# Patient Record
Sex: MALE | Race: WHITE | Employment: OTHER | URBAN - METROPOLITAN AREA
[De-identification: names, ages, dates, MRNs, and addresses within clinical notes are randomized per-mention and may not be internally consistent; named-entity substitution may affect disease eponyms.]

---

## 2022-04-01 PROBLEM — R60.9 EDEMA: Status: ACTIVE | Noted: 2022-04-01

## 2022-04-01 PROBLEM — A04.8 HELICOBACTER PYLORI GASTROINTESTINAL TRACT INFECTION: Status: ACTIVE | Noted: 2020-12-01

## 2022-04-01 PROBLEM — D64.9 ANEMIA: Status: ACTIVE | Noted: 2022-04-01

## 2022-04-01 PROBLEM — J44.9 MILD CHRONIC OBSTRUCTIVE PULMONARY DISEASE (HCC): Status: ACTIVE | Noted: 2022-04-01

## 2022-04-01 PROBLEM — N40.0 BENIGN PROSTATIC HYPERPLASIA: Status: ACTIVE | Noted: 2022-04-01

## 2022-04-01 PROBLEM — M19.90 OSTEOARTHROSIS: Status: ACTIVE | Noted: 2022-04-01

## 2022-04-01 PROBLEM — C22.0 HEPATOCELLULAR CARCINOMA (HCC): Status: ACTIVE | Noted: 2022-04-01

## 2022-04-01 PROBLEM — R63.4 ABNORMAL WEIGHT LOSS: Status: ACTIVE | Noted: 2022-04-01

## 2022-04-01 PROBLEM — B19.20 VIRAL HEPATITIS C: Status: ACTIVE | Noted: 2022-04-01

## 2022-04-01 PROBLEM — B18.2 HEPATIC CIRRHOSIS DUE TO CHRONIC HEPATITIS C INFECTION (HCC): Status: ACTIVE | Noted: 2022-04-01

## 2022-04-01 PROBLEM — E51.9 THIAMINE DEFICIENCY: Status: ACTIVE | Noted: 2022-04-01

## 2022-04-01 PROBLEM — K74.60 HEPATIC CIRRHOSIS DUE TO CHRONIC HEPATITIS C INFECTION (HCC): Status: ACTIVE | Noted: 2022-04-01

## 2022-04-01 PROBLEM — C22.9 MALIGNANT NEOPLASM OF LIVER (HCC): Status: ACTIVE | Noted: 2022-04-01

## 2022-04-01 PROBLEM — D89.2 HYPERGAMMAGLOBULINEMIA: Status: ACTIVE | Noted: 2022-04-01

## 2022-09-23 NOTE — H&P (VIEW-ONLY)
ASSESSMENT:  1. BPH w/LUTS on CIC x5 daily    Rx: Flomax 0.8 mg and Avodart 0.5 mg   Symptom(s): urinary retention. Size: 140 cc (TRUS 04/2022)   Cysto 4/2/22: trilobar hypertrophy    UDS 05/20/22 : Obstructed     2. PSA Screening   Last PSA 07/26/21: 3.6   JULISSA 07/20/22 100 grams and benign  JULISSA 03/18/22-  Prostate is 3+. 3. Recurrent UTI   Last UCx 05/11/22, + E.faecalis treated w/ Macrobid 100 mg BID x 10 days    4. Hepatocellular Carcinoma, s/p partial DaVinci hepatectomy in 6/2021 at [facility] with Hepatology in the [facility]    5. H/o Alcoholic Cirrhosis of Liver  7. H/o Hepatitis C    Indication retention, proceed to HoLEP. Cleared by GI. Platelets >29 OK. PLAN:    1. Cath urine sent for culture today, will rx abx pre-operatively to treat what grows   2. Proceed to scheduled for HoLEP 10/17/22  3. Continue CIC x5, happy to refills CIC re-teaching performed today. 4 Continue Flomax 0.8mg daily until surgery  5. Continue Avodart 0.5mg daily, until surgery   6. Provided literature and video discussing HoLEP procedure-provided last office visit  7. RTC Post-op         DISCUSSION:  Discussed BPH at length, including problems that can when untreated can lead to  infection, bladder stones, hematuria, incontinence, bladder damage and kidney damage but not limited to these conditions. Discussed the options for management of LUTS/Benign Prostatic Hyperplasia;  including watchful waiting, medical therapy, and prostate procedures briefly - including Urolift, Rezum, and PVP. Chief Complaint   Patient presents with    Benign Prostatic Hypertrophy    (LUTS) Lower Urinary Tract Symptoms           HISTORY OF PRESENT ILLNESS: Miguel Guajardo is a 81 y.o. male who is seen today for a HoLEP consultation. Prev. followed by Dr. Anabella Chapa.  A cystoscopy performed on 4/2/2022 revealed trilobar hypertrophy, was able to get over the median lobe and see the right UO but unable to see left UO 2/2 median lobe. He continues with Flomax 0.4mg and Avodart 0.5mg. Underwent UDS and attempted to void with a prolonged continuous high pressure detrusor contraction reaching a high of 80cm H2O without flow. Continues to perform CIC x 5 a day, he experiences intermittent episodes of bleeding after use of catheter. Today patient reports doing well in the interim. Tentatively scheduled for HoLEP 10/17 pending clearance  Pt. States that he consulted with GI/Hepatology  in the interim @ the [address]   He is ready to proceed to HoLEP. Expressed understanding of everything that was discussed today. R/Bs Discussed. Expressed Abx have been cost prohibitive in the past as he gets his medication from the [facility] and states that he is on a restricted income    Baseline Symptoms:  Retention: on CIC x5 daily since 2021  Denies flank/abd pain  Denies dysuria,   Denies hematuria  Rx: Flomax 0.8 mg and Avodart 0.5 mg  Recurrent UTI:  Last UCx 22, + E.faecalis  treated w/ Macrobid 100 mg BID x 10 days    Denies f/c/n/v.  ED: Not sexually active at this time. Not bothered by potential anejaculation SEs of HoLEP    PMHx  s/p partial DaVinci hepatectomy for hepatocellular carcinoma in 2021 at [facility]      No flowsheet data found. Past Medical History:   Diagnosis Date    Alcoholic cirrhosis of liver with ascites (HCC)     Anemia     BPH (benign prostatic hyperplasia)     COPD (chronic obstructive pulmonary disease) (HCC)     GERD (gastroesophageal reflux disease)     History of hepatitis C     History of urinary retention     Osteoarthritis        History reviewed. No pertinent surgical history. Social History     Tobacco Use    Smoking status: Former     Types: Cigarettes     Quit date: 3/18/2017     Years since quittin.5    Smokeless tobacco: Former       Allergies   Allergen Reactions    Penicillins Itching, Hives and Shortness of Breath    Vit E-Nonoxynol 9-Aloe Vera Rash       History reviewed.  No pertinent family history. Review of Systems  Constitutional: Fever: No  Skin: Rash: No  HEENT: Hearing difficulty: No  Eyes: Blurred vision: No  Cardiovascular: Chest pain: No  Respiratory: Shortness of breath: No  Gastrointestinal: Nausea/vomiting: No  Musculoskeletal: Back pain: No  Neurological: Weakness:   Psychological: Memory loss: No  Comments/additional findings:     Constitutional: WDWN, Pleasant and appropriate affect, No acute distress. CV:  No peripheral swelling noted  Respiratory: No respiratory distress or difficulties   Male:    JULISSA 07/20/2022: Perineum normal to visual inspection, no erythema or irritation, Sphincter with good tone, Rectum with multiple external and internal hemorrhoids, no fissures or masses. Prostate is 100 grams and benign  Skin: No jaundice. Neuro/Psych:  Alert and oriented x 3. Affect appropriate.          REVIEW OF LABS AND IMAGING:      Results for orders placed or performed during the hospital encounter of 09/28/22   EKG, 12 LEAD, INITIAL   Result Value Ref Range    Ventricular Rate 61 BPM    Atrial Rate 61 BPM    P-R Interval 152 ms    QRS Duration 80 ms    Q-T Interval 416 ms    QTC Calculation (Bezet) 418 ms    Calculated P Axis 38 degrees    Calculated R Axis 74 degrees    Calculated T Axis 69 degrees    Diagnosis       Normal sinus rhythm  Normal ECG  No previous ECGs available     Results for orders placed or performed in visit on 09/28/22   AMB POC URINALYSIS DIP STICK AUTO W/O MICRO   Result Value Ref Range    Color (UA POC) Yellow     Clarity (UA POC) Clear     Glucose (UA POC) Negative Negative    Bilirubin (UA POC) Negative Negative    Ketones (UA POC) Negative Negative    Specific gravity (UA POC) 1.015 1.001 - 1.035    Blood (UA POC) Negative Negative    pH (UA POC) 6.5 4.6 - 8.0    Protein (UA POC) Negative Negative    Urobilinogen (UA POC) 0.2 mg/dL 0.2 - 1    Nitrites (UA POC) Negative Negative    Leukocyte esterase (UA POC) Negative Negative Roni Rodgers MD  [facility] [address], [facility] [address]  [phone] (office)      Encounter Diagnoses     ICD-10-CM ICD-9-CM   1. BPH with obstruction/lower urinary tract symptoms  N40.1 600.01    N13.8 599.69   2.  Urinary retention  R33.9 788.20           Medical documentation provided with the assistance of Leatha Hernandez, medical scribe for Roni Rodgers MD.

## 2022-09-27 RX ORDER — AMPICILLIN 2 G/1
2 INJECTION, POWDER, FOR SOLUTION INTRAVENOUS EVERY 6 HOURS
Status: CANCELLED | OUTPATIENT
Start: 2022-10-17

## 2022-09-28 ENCOUNTER — TRANSCRIBE ORDER (OUTPATIENT)
Dept: REGISTRATION | Age: 72
End: 2022-09-28

## 2022-09-28 ENCOUNTER — HOSPITAL ENCOUNTER (OUTPATIENT)
Dept: LAB | Age: 72
Discharge: HOME | End: 2022-09-28
Payer: OTHER GOVERNMENT

## 2022-09-28 DIAGNOSIS — R33.8 ENLARGED PROSTATE WITH URINARY RETENTION: ICD-10-CM

## 2022-09-28 DIAGNOSIS — Z01.812 BLOOD TESTS PRIOR TO TREATMENT OR PROCEDURE: ICD-10-CM

## 2022-09-28 DIAGNOSIS — N40.1 ENLARGED PROSTATE WITH URINARY OBSTRUCTION: ICD-10-CM

## 2022-09-28 DIAGNOSIS — N40.1 ENLARGED PROSTATE WITH URINARY OBSTRUCTION: Primary | ICD-10-CM

## 2022-09-28 DIAGNOSIS — N40.1 ENLARGED PROSTATE WITH URINARY RETENTION: ICD-10-CM

## 2022-09-28 DIAGNOSIS — N13.8 ENLARGED PROSTATE WITH URINARY OBSTRUCTION: Primary | ICD-10-CM

## 2022-09-28 DIAGNOSIS — N13.8 ENLARGED PROSTATE WITH URINARY OBSTRUCTION: ICD-10-CM

## 2022-09-28 LAB
ALBUMIN SERPL-MCNC: 3.3 G/DL (ref 3.4–5)
ALBUMIN/GLOB SERPL: 0.8 {RATIO} (ref 0.8–1.7)
ALP SERPL-CCNC: 119 U/L (ref 45–117)
ALT SERPL-CCNC: 25 U/L (ref 16–61)
ANION GAP SERPL CALC-SCNC: 5 MMOL/L (ref 3–18)
AST SERPL-CCNC: 29 U/L (ref 10–38)
ATRIAL RATE: 61 BPM
BASOPHILS # BLD: 0 K/UL (ref 0–0.1)
BASOPHILS NFR BLD: 1 % (ref 0–2)
BILIRUB SERPL-MCNC: 1 MG/DL (ref 0.2–1)
BUN SERPL-MCNC: 9 MG/DL (ref 7–18)
BUN/CREAT SERPL: 12 (ref 12–20)
CALCIUM SERPL-MCNC: 8.6 MG/DL (ref 8.5–10.1)
CALCULATED P AXIS, ECG09: 38 DEGREES
CALCULATED R AXIS, ECG10: 74 DEGREES
CALCULATED T AXIS, ECG11: 69 DEGREES
CHLORIDE SERPL-SCNC: 109 MMOL/L (ref 100–111)
CO2 SERPL-SCNC: 27 MMOL/L (ref 21–32)
CREAT SERPL-MCNC: 0.74 MG/DL (ref 0.6–1.3)
DIAGNOSIS, 93000: NORMAL
DIFFERENTIAL METHOD BLD: ABNORMAL
EOSINOPHIL # BLD: 0.3 K/UL (ref 0–0.4)
EOSINOPHIL NFR BLD: 6 % (ref 0–5)
ERYTHROCYTE [DISTWIDTH] IN BLOOD BY AUTOMATED COUNT: 14.6 % (ref 11.6–14.5)
GLOBULIN SER CALC-MCNC: 4.1 G/DL (ref 2–4)
GLUCOSE SERPL-MCNC: 99 MG/DL (ref 74–99)
HCT VFR BLD AUTO: 42.3 % (ref 36–48)
HGB BLD-MCNC: 13.9 G/DL (ref 13–16)
IMM GRANULOCYTES # BLD AUTO: 0 K/UL (ref 0–0.04)
IMM GRANULOCYTES NFR BLD AUTO: 0 % (ref 0–0.5)
INR PPP: 1.2 (ref 0.8–1.2)
LYMPHOCYTES # BLD: 0.9 K/UL (ref 0.9–3.6)
LYMPHOCYTES NFR BLD: 20 % (ref 21–52)
MCH RBC QN AUTO: 30.6 PG (ref 24–34)
MCHC RBC AUTO-ENTMCNC: 32.9 G/DL (ref 31–37)
MCV RBC AUTO: 93.2 FL (ref 78–100)
MONOCYTES # BLD: 0.4 K/UL (ref 0.05–1.2)
MONOCYTES NFR BLD: 10 % (ref 3–10)
NEUTS SEG # BLD: 2.8 K/UL (ref 1.8–8)
NEUTS SEG NFR BLD: 63 % (ref 40–73)
NRBC # BLD: 0 K/UL (ref 0–0.01)
NRBC BLD-RTO: 0 PER 100 WBC
P-R INTERVAL, ECG05: 152 MS
PLATELET # BLD AUTO: 86 K/UL (ref 135–420)
PLATELET COMMENTS,PCOM: ABNORMAL
PMV BLD AUTO: 10.8 FL (ref 9.2–11.8)
POTASSIUM SERPL-SCNC: 4.7 MMOL/L (ref 3.5–5.5)
PROT SERPL-MCNC: 7.4 G/DL (ref 6.4–8.2)
PROTHROMBIN TIME: 15.3 SEC (ref 11.5–15.2)
Q-T INTERVAL, ECG07: 416 MS
QRS DURATION, ECG06: 80 MS
QTC CALCULATION (BEZET), ECG08: 418 MS
RBC # BLD AUTO: 4.54 M/UL (ref 4.35–5.65)
RBC MORPH BLD: ABNORMAL
SODIUM SERPL-SCNC: 141 MMOL/L (ref 136–145)
VENTRICULAR RATE, ECG03: 61 BPM
WBC # BLD AUTO: 4.4 K/UL (ref 4.6–13.2)

## 2022-09-28 PROCEDURE — 85610 PROTHROMBIN TIME: CPT

## 2022-09-28 PROCEDURE — 80053 COMPREHEN METABOLIC PANEL: CPT

## 2022-09-28 PROCEDURE — 93005 ELECTROCARDIOGRAM TRACING: CPT

## 2022-09-28 PROCEDURE — 36415 COLL VENOUS BLD VENIPUNCTURE: CPT

## 2022-09-28 PROCEDURE — 85025 COMPLETE CBC W/AUTO DIFF WBC: CPT

## 2022-10-11 NOTE — PERIOP NOTES
PRE-SURGICAL INSTRUCTIONS        Patient's Name:  Nkechi Taveras      OZWKZALEN Date:  10/11/2022            Surgery Date:  10/17/2022                Do NOT eat or drink anything, including candy, gum, or ice chips after midnight on 10/17/2022, unless you have specific instructions from your surgeon or anesthesia provider to do so. You may brush your teeth before coming to the hospital.  No smoking 24 hours prior to the day of surgery. No alcohol 24 hours prior to the day of surgery. No recreational drugs for one week prior to the day of surgery. Leave all valuables, including money/purse, at home. Remove all jewelry, nail polish, acrylic nails, and makeup (including mascara); no lotions powders, deodorant, or perfume/cologne/after shave on the skin. Follow instruction for Hibiclens washes and CHG wipes from surgeon's office. Glasses/contact lenses and dentures may be worn to the hospital.  They will be removed prior to surgery. Call your doctor if symptoms of a cold or illness develop within 24-48 hours prior to your surgery. 11.  If you are having an outpatient procedure, please make arrangements for a responsible ADULT TO [address] [place] [address] and stay with you for 24 hours after your surgery. 12. ONE VISITOR in the hospital at this time for outpatient procedures. Exceptions may be made for surgical admissions, per nursing unit guidelines      Special Instructions:      Bring list of CURRENT medications. Bring inhaler. Bring any pertinent legal medical records. Take these medications the morning of surgery with a sip of water:  advair inhalor          On the day of surgery, come in the main entrance of [facility]. Let the  at the desk know you are there for surgery. A staff member will come escort you to the surgical area on the second floor.     If you have any questions or concerns, please do not hesitate to call:     (Prior to the day of surgery) REGINALDO department:  [phone]   (Day of surgery) Pre-Op department:  [phone]    These surgical instructions were reviewed with Carl Vieira during the PAT phone call.

## 2022-10-14 ENCOUNTER — ANESTHESIA EVENT (OUTPATIENT)
Dept: SURGERY | Age: 72
End: 2022-10-14
Payer: OTHER GOVERNMENT

## 2022-10-17 ENCOUNTER — ANESTHESIA (OUTPATIENT)
Dept: SURGERY | Age: 72
End: 2022-10-17
Payer: OTHER GOVERNMENT

## 2022-10-17 ENCOUNTER — HOSPITAL ENCOUNTER (OUTPATIENT)
Age: 72
Discharge: HOME | End: 2022-10-17
Attending: UROLOGY | Admitting: UROLOGY
Payer: OTHER GOVERNMENT

## 2022-10-17 VITALS
HEIGHT: 72 IN | SYSTOLIC BLOOD PRESSURE: 104 MMHG | OXYGEN SATURATION: 97 % | HEART RATE: 86 BPM | TEMPERATURE: 97.4 F | DIASTOLIC BLOOD PRESSURE: 55 MMHG | BODY MASS INDEX: 23.16 KG/M2 | WEIGHT: 171 LBS | RESPIRATION RATE: 16 BRPM

## 2022-10-17 DIAGNOSIS — N40.1 ENLARGED PROSTATE WITH URINARY OBSTRUCTION: Primary | ICD-10-CM

## 2022-10-17 DIAGNOSIS — N13.8 ENLARGED PROSTATE WITH URINARY OBSTRUCTION: Primary | ICD-10-CM

## 2022-10-17 LAB
AMPHET UR QL SCN: NEGATIVE
BARBITURATES UR QL SCN: NEGATIVE
BENZODIAZ UR QL: NEGATIVE
CANNABINOIDS UR QL SCN: POSITIVE
COCAINE UR QL SCN: NEGATIVE
HDSCOM,HDSCOM: ABNORMAL
METHADONE UR QL: NEGATIVE
OPIATES UR QL: NEGATIVE
PCP UR QL: NEGATIVE

## 2022-10-17 PROCEDURE — 74011000258 HC RX REV CODE- 258: Performed by: NURSE ANESTHETIST, CERTIFIED REGISTERED

## 2022-10-17 PROCEDURE — 2709999900 HC NON-CHARGEABLE SUPPLY: Performed by: UROLOGY

## 2022-10-17 PROCEDURE — 77030018836 HC SOL IRR NACL ICUM -A: Performed by: UROLOGY

## 2022-10-17 PROCEDURE — 77030012961 HC IRR KT CYSTO/TUR ICUM -A: Performed by: UROLOGY

## 2022-10-17 PROCEDURE — 77030040922 HC BLNKT HYPOTHRM STRY -A: Performed by: UROLOGY

## 2022-10-17 PROCEDURE — 76210000022 HC REC RM PH II 1.5 TO 2 HR: Performed by: UROLOGY

## 2022-10-17 PROCEDURE — 77030037203 HC TBNG MORCLTR PIRHANA DISP RWOL -C: Performed by: UROLOGY

## 2022-10-17 PROCEDURE — 00914 ANES TRURL PX RESCJ PRST8: CPT | Performed by: NURSE ANESTHETIST, CERTIFIED REGISTERED

## 2022-10-17 PROCEDURE — 74011250636 HC RX REV CODE- 250/636: Performed by: UROLOGY

## 2022-10-17 PROCEDURE — 74011250636 HC RX REV CODE- 250/636: Performed by: NURSE ANESTHETIST, CERTIFIED REGISTERED

## 2022-10-17 PROCEDURE — 77030040361 HC SLV COMPR DVT MDII -B: Performed by: UROLOGY

## 2022-10-17 PROCEDURE — 74011250637 HC RX REV CODE- 250/637: Performed by: UROLOGY

## 2022-10-17 PROCEDURE — 76060000035 HC ANESTHESIA 2 TO 2.5 HR: Performed by: UROLOGY

## 2022-10-17 PROCEDURE — 74011000250 HC RX REV CODE- 250: Performed by: NURSE ANESTHETIST, CERTIFIED REGISTERED

## 2022-10-17 PROCEDURE — 74011000258 HC RX REV CODE- 258: Performed by: UROLOGY

## 2022-10-17 PROCEDURE — 00914 ANES TRURL PX RESCJ PRST8: CPT | Performed by: ANESTHESIOLOGY

## 2022-10-17 PROCEDURE — 76010000171 HC OR TIME 2 TO 2.5 HR INTENSV-TIER 1: Performed by: UROLOGY

## 2022-10-17 PROCEDURE — 74011250637 HC RX REV CODE- 250/637: Performed by: NURSE ANESTHETIST, CERTIFIED REGISTERED

## 2022-10-17 PROCEDURE — 88305 TISSUE EXAM BY PATHOLOGIST: CPT

## 2022-10-17 PROCEDURE — 77030020847 HC STATLOK BARD -A: Performed by: UROLOGY

## 2022-10-17 PROCEDURE — 74011000250 HC RX REV CODE- 250: Performed by: UROLOGY

## 2022-10-17 PROCEDURE — 80307 DRUG TEST PRSMV CHEM ANLYZR: CPT

## 2022-10-17 PROCEDURE — C1782 MORCELLATOR: HCPCS | Performed by: UROLOGY

## 2022-10-17 PROCEDURE — 76210000017 HC OR PH I REC 1.5 TO 2 HR: Performed by: UROLOGY

## 2022-10-17 RX ORDER — SODIUM CHLORIDE, SODIUM LACTATE, POTASSIUM CHLORIDE, CALCIUM CHLORIDE 600; 310; 30; 20 MG/100ML; MG/100ML; MG/100ML; MG/100ML
25 INJECTION, SOLUTION INTRAVENOUS CONTINUOUS
Status: DISCONTINUED | OUTPATIENT
Start: 2022-10-17 | End: 2022-10-17 | Stop reason: HOSPADM

## 2022-10-17 RX ORDER — SODIUM CHLORIDE 0.9 % (FLUSH) 0.9 %
5-40 SYRINGE (ML) INJECTION EVERY 8 HOURS
Status: DISCONTINUED | OUTPATIENT
Start: 2022-10-17 | End: 2022-10-17 | Stop reason: HOSPADM

## 2022-10-17 RX ORDER — FENTANYL CITRATE 50 UG/ML
INJECTION, SOLUTION INTRAMUSCULAR; INTRAVENOUS AS NEEDED
Status: DISCONTINUED | OUTPATIENT
Start: 2022-10-17 | End: 2022-10-17 | Stop reason: HOSPADM

## 2022-10-17 RX ORDER — LIDOCAINE HYDROCHLORIDE 10 MG/ML
0.1 INJECTION, SOLUTION EPIDURAL; INFILTRATION; INTRACAUDAL; PERINEURAL AS NEEDED
Status: DISCONTINUED | OUTPATIENT
Start: 2022-10-17 | End: 2022-10-17 | Stop reason: HOSPADM

## 2022-10-17 RX ORDER — MIDAZOLAM HYDROCHLORIDE 1 MG/ML
INJECTION, SOLUTION INTRAMUSCULAR; INTRAVENOUS AS NEEDED
Status: DISCONTINUED | OUTPATIENT
Start: 2022-10-17 | End: 2022-10-17 | Stop reason: HOSPADM

## 2022-10-17 RX ORDER — SUCCINYLCHOLINE CHLORIDE 100 MG/5ML
SYRINGE (ML) INTRAVENOUS AS NEEDED
Status: DISCONTINUED | OUTPATIENT
Start: 2022-10-17 | End: 2022-10-17 | Stop reason: HOSPADM

## 2022-10-17 RX ORDER — TRAMADOL HYDROCHLORIDE 50 MG/1
50 TABLET ORAL
Qty: 10 TABLET | Refills: 0 | Status: SHIPPED | OUTPATIENT
Start: 2022-10-17 | End: 2022-10-20

## 2022-10-17 RX ORDER — PROPOFOL 10 MG/ML
INJECTION, EMULSION INTRAVENOUS AS NEEDED
Status: DISCONTINUED | OUTPATIENT
Start: 2022-10-17 | End: 2022-10-17 | Stop reason: HOSPADM

## 2022-10-17 RX ORDER — SODIUM CHLORIDE 0.9 % (FLUSH) 0.9 %
5-40 SYRINGE (ML) INJECTION AS NEEDED
Status: DISCONTINUED | OUTPATIENT
Start: 2022-10-17 | End: 2022-10-17 | Stop reason: HOSPADM

## 2022-10-17 RX ORDER — OXYCODONE HYDROCHLORIDE 5 MG/1
5 TABLET ORAL
Status: DISCONTINUED | OUTPATIENT
Start: 2022-10-17 | End: 2022-10-17 | Stop reason: HOSPADM

## 2022-10-17 RX ORDER — FUROSEMIDE 10 MG/ML
20 INJECTION INTRAMUSCULAR; INTRAVENOUS ONCE
Status: COMPLETED | OUTPATIENT
Start: 2022-10-17 | End: 2022-10-17

## 2022-10-17 RX ORDER — FAMOTIDINE 20 MG/1
20 TABLET, FILM COATED ORAL ONCE
Status: COMPLETED | OUTPATIENT
Start: 2022-10-17 | End: 2022-10-17

## 2022-10-17 RX ORDER — LIDOCAINE HYDROCHLORIDE 20 MG/ML
INJECTION, SOLUTION EPIDURAL; INFILTRATION; INTRACAUDAL; PERINEURAL AS NEEDED
Status: DISCONTINUED | OUTPATIENT
Start: 2022-10-17 | End: 2022-10-17 | Stop reason: HOSPADM

## 2022-10-17 RX ORDER — OXYBUTYNIN CHLORIDE 5 MG/1
10 TABLET, EXTENDED RELEASE ORAL DAILY
Status: DISCONTINUED | OUTPATIENT
Start: 2022-10-17 | End: 2022-10-17 | Stop reason: HOSPADM

## 2022-10-17 RX ORDER — ROCURONIUM BROMIDE 10 MG/ML
INJECTION, SOLUTION INTRAVENOUS AS NEEDED
Status: DISCONTINUED | OUTPATIENT
Start: 2022-10-17 | End: 2022-10-17 | Stop reason: HOSPADM

## 2022-10-17 RX ORDER — OXYCODONE AND ACETAMINOPHEN 5; 325 MG/1; MG/1
1 TABLET ORAL AS NEEDED
Status: DISCONTINUED | OUTPATIENT
Start: 2022-10-17 | End: 2022-10-17

## 2022-10-17 RX ORDER — ACETAMINOPHEN 500 MG
1000 TABLET ORAL
Status: DISCONTINUED | OUTPATIENT
Start: 2022-10-17 | End: 2022-10-17 | Stop reason: HOSPADM

## 2022-10-17 RX ORDER — MAGNESIUM SULFATE 100 %
4 CRYSTALS MISCELLANEOUS AS NEEDED
Status: DISCONTINUED | OUTPATIENT
Start: 2022-10-17 | End: 2022-10-17 | Stop reason: HOSPADM

## 2022-10-17 RX ORDER — FENTANYL CITRATE 50 UG/ML
25 INJECTION, SOLUTION INTRAMUSCULAR; INTRAVENOUS AS NEEDED
Status: DISCONTINUED | OUTPATIENT
Start: 2022-10-17 | End: 2022-10-17 | Stop reason: HOSPADM

## 2022-10-17 RX ORDER — NEOSTIGMINE METHYLSULFATE 1 MG/ML
INJECTION, SOLUTION INTRAVENOUS AS NEEDED
Status: DISCONTINUED | OUTPATIENT
Start: 2022-10-17 | End: 2022-10-17 | Stop reason: HOSPADM

## 2022-10-17 RX ORDER — CHLORHEXIDINE GLUCONATE 4 G/100ML
SOLUTION TOPICAL AS NEEDED
Status: DISCONTINUED | OUTPATIENT
Start: 2022-10-17 | End: 2022-10-17 | Stop reason: HOSPADM

## 2022-10-17 RX ORDER — DEXTROSE MONOHYDRATE 100 MG/ML
0-250 INJECTION, SOLUTION INTRAVENOUS AS NEEDED
Status: DISCONTINUED | OUTPATIENT
Start: 2022-10-17 | End: 2022-10-17 | Stop reason: HOSPADM

## 2022-10-17 RX ORDER — AMPICILLIN 2 G/1
2 INJECTION, POWDER, FOR SOLUTION INTRAVENOUS EVERY 6 HOURS
Status: DISCONTINUED | OUTPATIENT
Start: 2022-10-17 | End: 2022-10-17 | Stop reason: CLARIF

## 2022-10-17 RX ORDER — ONDANSETRON 2 MG/ML
INJECTION INTRAMUSCULAR; INTRAVENOUS AS NEEDED
Status: DISCONTINUED | OUTPATIENT
Start: 2022-10-17 | End: 2022-10-17 | Stop reason: HOSPADM

## 2022-10-17 RX ORDER — PHENAZOPYRIDINE HYDROCHLORIDE 200 MG/1
200 TABLET, FILM COATED ORAL 3 TIMES DAILY
Status: DISCONTINUED | OUTPATIENT
Start: 2022-10-17 | End: 2022-10-17 | Stop reason: HOSPADM

## 2022-10-17 RX ORDER — LEVOFLOXACIN 500 MG/1
500 TABLET, FILM COATED ORAL DAILY
Qty: 5 TABLET | Refills: 0 | Status: SHIPPED | OUTPATIENT
Start: 2022-10-17 | End: 2022-10-22

## 2022-10-17 RX ORDER — FENTANYL CITRATE 50 UG/ML
50 INJECTION, SOLUTION INTRAMUSCULAR; INTRAVENOUS
Status: DISCONTINUED | OUTPATIENT
Start: 2022-10-17 | End: 2022-10-17 | Stop reason: HOSPADM

## 2022-10-17 RX ORDER — PHENAZOPYRIDINE HYDROCHLORIDE 200 MG/1
200 TABLET, FILM COATED ORAL 3 TIMES DAILY
Qty: 15 TABLET | Refills: 0 | Status: SHIPPED | OUTPATIENT
Start: 2022-10-17 | End: 2022-10-22

## 2022-10-17 RX ORDER — ATROPA BELLADONNA AND OPIUM 16.2; 3 MG/1; MG/1
SUPPOSITORY RECTAL AS NEEDED
Status: DISCONTINUED | OUTPATIENT
Start: 2022-10-17 | End: 2022-10-17 | Stop reason: HOSPADM

## 2022-10-17 RX ORDER — GLYCOPYRROLATE 0.2 MG/ML
INJECTION INTRAMUSCULAR; INTRAVENOUS AS NEEDED
Status: DISCONTINUED | OUTPATIENT
Start: 2022-10-17 | End: 2022-10-17 | Stop reason: HOSPADM

## 2022-10-17 RX ADMIN — LIDOCAINE HYDROCHLORIDE 40 MG: 20 INJECTION, SOLUTION EPIDURAL; INFILTRATION; INTRACAUDAL; PERINEURAL at 10:37

## 2022-10-17 RX ADMIN — PROPOFOL 150 MG: 10 INJECTION, EMULSION INTRAVENOUS at 10:37

## 2022-10-17 RX ADMIN — ROCURONIUM BROMIDE 30 MG: 50 INJECTION INTRAVENOUS at 10:37

## 2022-10-17 RX ADMIN — SODIUM CHLORIDE 1 G: 9 INJECTION, SOLUTION INTRAVENOUS at 10:59

## 2022-10-17 RX ADMIN — NEOSTIGMINE METHYLSULFATE 3 MG: 1 INJECTION, SOLUTION INTRAVENOUS at 12:38

## 2022-10-17 RX ADMIN — FAMOTIDINE 20 MG: 20 TABLET ORAL at 09:18

## 2022-10-17 RX ADMIN — PHENAZOPYRIDINE 200 MG: 200 TABLET ORAL at 14:02

## 2022-10-17 RX ADMIN — FENTANYL CITRATE 50 MCG: 50 INJECTION, SOLUTION INTRAMUSCULAR; INTRAVENOUS at 10:37

## 2022-10-17 RX ADMIN — GENTAMICIN SULFATE 400 MG: 40 INJECTION, SOLUTION INTRAMUSCULAR; INTRAVENOUS at 10:42

## 2022-10-17 RX ADMIN — SODIUM CHLORIDE, POTASSIUM CHLORIDE, SODIUM LACTATE AND CALCIUM CHLORIDE 25 ML/HR: 600; 310; 30; 20 INJECTION, SOLUTION INTRAVENOUS at 09:18

## 2022-10-17 RX ADMIN — SODIUM CHLORIDE 1 G: 9 INJECTION, SOLUTION INTRAVENOUS at 12:12

## 2022-10-17 RX ADMIN — ONDANSETRON 4 MG: 2 INJECTION INTRAMUSCULAR; INTRAVENOUS at 12:32

## 2022-10-17 RX ADMIN — OXYBUTYNIN CHLORIDE 10 MG: 5 TABLET, EXTENDED RELEASE ORAL at 14:01

## 2022-10-17 RX ADMIN — ROCURONIUM BROMIDE 10 MG: 50 INJECTION INTRAVENOUS at 11:38

## 2022-10-17 RX ADMIN — GLYCOPYRROLATE 0.4 MG: 0.2 INJECTION INTRAMUSCULAR; INTRAVENOUS at 12:38

## 2022-10-17 RX ADMIN — MIDAZOLAM HYDROCHLORIDE 2 MG: 2 INJECTION, SOLUTION INTRAMUSCULAR; INTRAVENOUS at 10:31

## 2022-10-17 RX ADMIN — FUROSEMIDE 20 MG: 10 INJECTION, SOLUTION INTRAMUSCULAR; INTRAVENOUS at 15:42

## 2022-10-17 RX ADMIN — Medication 100 MG: at 10:37

## 2022-10-17 RX ADMIN — FENTANYL CITRATE 50 MCG: 50 INJECTION, SOLUTION INTRAMUSCULAR; INTRAVENOUS at 11:07

## 2022-10-17 NOTE — DISCHARGE INSTRUCTIONS
Discharge Instructions  ACTIVITY:   You are restricted from lifting greater than 10 pounds for 1 week from the date of surgery until the urine is consistently clear. You may resume driving once able to perform the activities of driving without pain. You may travel by airplane or ground transportation after discharge. A short walk is recommended at least once every hour during long journeys. Avoid sexual intercourse for 2 weeks from the date of surgery. It is common to experience bleeding with ejaculation during the healing period. Avoid activities which place pressure in the pelvic area such as bicycling for 4 weeks from the date of surgery. CATHETER:  Indwelling catheter care:  1. Maintain sterile, closed gravity drainage system:          a. Secure the catheter to upper thigh or abdomen to avoid urethral irritation and contamination. b. Empty the catheter bag as needed. c. Do not routinely irrigate the catheter. d. Do not clamp or kink the drainage tubing, and keep the collection bag below bladder level at all times. 2.     If urine leaks around the catheter in the absence of obstruction, it is likely due to a bladder spasm. ADDITIONAL INFORMATION:     - If you experience any fevers > 100.4, significant nausea / vomiting, or significant worsening of pain, please contact us at the number below. - It is common to experience recurrent blood in your urine for several months after surgery. Although there should be a general trend of decreasing bleeding over time, it is not uncommon for bleeding to recur after periods of no bleeding. If you notice passage of clots, you should increase your liquid intake in order to reduce the number of clots encountered. If the bleeding continues to worsen with inability to pass clots, inability to urinate, or you experience significant light-headedness, please contact us at the number above.     - Burning with urination, or dysuria, is common after this procedure. This may occur at any time of the urinary stream.  This will continue to improve over time. - It is common to temporary urinary leakage after this procedure. This will continue to improve over time. You may additionally perform Kegel exercises to help build the muscles at the base of the bladder. FOLLOW UP CARE:  You have an appointment tomorrow in the clinic for catheter removal.      Following this you will have an appointment in 3 months with Dr. Carol Otto. Prior to the appointment you will have a PSA, urinalysis, uroflow with PVR followed by an office visit. DISCHARGE SUMMARY from Nurse    PATIENT INSTRUCTIONS:    After general anesthesia or intravenous sedation, for 24 hours or while taking prescription Narcotics:  Limit your activities  Do not drive and operate hazardous machinery  Do not make important personal or business decisions  Do  not drink alcoholic beverages  If you have not urinated within 8 hours after discharge, please contact your surgeon on call. Report the following to your surgeon:  Excessive pain, swelling, redness or odor of or around the surgical area  Temperature over 100.5  Nausea and vomiting lasting longer than 4 hours or if unable to take medications  Any signs of decreased circulation or nerve impairment to extremity: change in color, persistent  numbness, tingling, coldness or increase pain  Any questions        These are general instructions for a healthy lifestyle:    No smoking/ No tobacco products/ Avoid exposure to second hand smoke  Surgeon General's Warning:  Quitting smoking now greatly reduces serious risk to your health.     Obesity, smoking, and sedentary lifestyle greatly increases your risk for illness    A healthy diet, regular physical exercise & weight monitoring are important for maintaining a healthy lifestyle    You may be retaining fluid if you have a history of heart failure or if you experience any of the following symptoms:  Weight gain of 3 pounds or more overnight or 5 pounds in a week, increased swelling in our hands or feet or shortness of breath while lying flat in bed. Please call your doctor as soon as you notice any of these symptoms; do not wait until your next office visit. The discharge information has been reviewed with the patient. The patient verbalized understanding. Discharge medications reviewed with the patient and appropriate educational materials and side effects teaching were provided.   ___________________________________________________________________________________________________________________________________

## 2022-10-17 NOTE — OP NOTES
LOCATION: [address]     OPERATIVE NOTE  10/17/2022, 12:59 PM      Pre Op Diagnosis:   1. Bladder Outlet Obstruction / Benign Prostatic Hypertrophy  2. Urinary Retention    Post Op Diagnosis:   1. Same     Procedure:   Holmium Laser Enucleation of the prostate     Findings:  Enucleation time: 61 min   Morcellation time: 12 min   Total Tissue retrieved: 97gm     Surgeon: Delmy Dang MD    Other OR Staff/Assistants:  Circ-1: Lowanda Nyhan, RN; Edson Ferris RN  Circ-Relief: Beronica Parisi  Scrub Tech-1: Hany aguirre Assistant Tasks: Assisting with operating room equipment    Anaesthesia: General     EBL: 50mL    Drains: 22 Fr 3 way catheter with 120 cc Balloon     Complications: None    DISPOSITION: Wean CBI over next 1-3 hours and then clamp. Give lasix, ambulate and if if no clots, home today with Pillai. VT tomorrow. INDICATION:    Molly Snellen is a 81 y.o.male who has a history of BPH and bladder outlet obstruction. He is on maximal medical therapy and remains in retention on CIC. He has a 140 gm prostate and is here today for HoLEP. PROCEDURE  Patient underwent general anesthesia and was prepped and draped in the standard sterile fashion in dorsal lithotomy position. After appropriate pause and confirmation of antibiotic administration, the urethra was dilated to 30 Fr with [employer] sounds. The Abundio Cousin was used to enter the bladder and the laser scope was inserted. Inspection was performed which revealed no tumors, trilobar hypertrophy and orthotopic ureteral orifices. En Bloc technique was utilized for this HoLEP. On a setting of 2 J and 40 Hz an incision was made at the apex just lateral and proximal to the veru and carried across to both the left and right in order elevate the adenoma from the capsule. Circumferential dissection was performed and anterior space was entered, which allowed early release of the sphincter from the adenoma. Circumferential dissection was then performed anteriorly/laterally. This plane was carried to the bladder neck which was transected and the bladder was re-entered. The lateral and posterior dissections were then performed sculpting the bladder neck taking care to avoid both UO's which were visualized and well away. The inferior dissection was then completed and the entire adenoma was flipped into the bladder. Bleeders were then coagulated by defocusing the laser on a setting of 2J and 40Hz. After hemostasis was achieved, the morcellator was inserted and the tissue was removed in its entirety. The fossa and bladder was inspected and there was no evidence of residual adenoma. The scope was removed and a 22 Fr catheter with 120 cc in the balloon was placed and put on tension. CBI was initiated and the patient was awoken from anesthesia. He tolerated the procedure well and was transferred to the recovery room in stable condition. Plan:  1. CBI x 2 hours  2. Lasix and clamp CBI   3. Ambulate, if no clots, home today  4. Pillai out tomorrow  5.  Return in 3 months with Flow/PVR, PSA and office visit       Wash Mode MD   [facility]

## 2022-10-17 NOTE — ANESTHESIA POSTPROCEDURE EVALUATION
Procedure(s):  HOLMIUM ENUCLEATION OF PROSTATE (HOLEP) *GENERALY W/PARALYSIS*. general    Anesthesia Post Evaluation      Multimodal analgesia: multimodal analgesia used between 6 hours prior to anesthesia start to PACU discharge  Patient location during evaluation: bedside  Patient participation: complete - patient participated  Level of consciousness: awake  Pain management: adequate  Airway patency: patent  Anesthetic complications: no  Cardiovascular status: stable  Respiratory status: acceptable  Hydration status: acceptable  Post anesthesia nausea and vomiting:  controlled  Final Post Anesthesia Temperature Assessment:  Normothermia (36.0-37.5 degrees C)      INITIAL Post-op Vital signs:   Vitals Value Taken Time   /67 10/17/22 1309   Temp 36.1 °C (97 °F) 10/17/22 1252   Pulse 88 10/17/22 1319   Resp 17 10/17/22 1319   SpO2 85 % 10/17/22 1255   Vitals shown include unvalidated device data.

## 2022-10-17 NOTE — INTERVAL H&P NOTE
Update History & Physical    The Patient's History and Physical of October 17,   Progress note was reviewed with the patient and I examined the patient. There was no change. The surgical site was confirmed by the patient and me. Plan:  The risk, benefits, expected outcome, and alternative to the recommended procedure have been discussed with the patient. Patient understands and wants to proceed with the procedure.     Electronically signed by Halley Landa MD on 10/17/2022 at 10:07 AM

## 2022-10-17 NOTE — PROGRESS NOTES
CBI clamped and lasix given at 1542. Patient  ambulated in hallway with assistance. Pillai catheter draining well and no worsening of hematuria prior to discharge.

## 2022-10-17 NOTE — ANESTHESIA PREPROCEDURE EVALUATION
Relevant Problems   RESPIRATORY SYSTEM   (+) Mild chronic obstructive pulmonary disease (HCC)      GASTROINTESTINAL   (+) Hepatic cirrhosis due to chronic hepatitis C infection (HCC)   (+) Hepatocellular carcinoma (HCC)   (+) Malignant neoplasm of liver (HCC)   (+) Viral hepatitis C      HEMATOLOGY   (+) Anemia      PERSONAL HX & FAMILY HX OF CANCER   (+) Hepatocellular carcinoma (HCC)   (+) Malignant neoplasm of liver (HCC)       Anesthetic History   No history of anesthetic complications            Review of Systems / Medical History  Patient summary reviewed, nursing notes reviewed and pertinent labs reviewed    Pulmonary    COPD               Neuro/Psych   Within defined limits           Cardiovascular                  Exercise tolerance: >4 METS     GI/Hepatic/Renal     GERD      Liver disease     Endo/Other        Arthritis     Other Findings   Comments: Hx of thc and cocaine           Physical Exam    Airway  Mallampati: II  TM Distance: 4 - 6 cm  Neck ROM: normal range of motion   Mouth opening: Normal     Cardiovascular  Regular rate and rhythm,  S1 and S2 normal,  no murmur, click, rub, or gallop  Rhythm: regular  Rate: normal         Dental    Dentition: Poor dentition     Pulmonary  Breath sounds clear to auscultation               Abdominal  GI exam deferred       Other Findings            Anesthetic Plan    ASA: 3  Anesthesia type: general          Induction: Intravenous  Anesthetic plan and risks discussed with: Patient and Healthcare power of

## (undated) DEVICE — STERILE LATEX POWDER-FREE SURGICAL GLOVESWITH NITRILE COATING: Brand: PROTEXIS

## (undated) DEVICE — TUBING IRRIG L77IN DIA0.241IN L BOR FOR CYSTO W/ NVENT

## (undated) DEVICE — PACK,CYSTOSCOPY,PK I,AURORA: Brand: MEDLINE

## (undated) DEVICE — SOLUTION IRRIG 1000ML H2O STRL BLT

## (undated) DEVICE — SOLUTION IV 1000ML 0.9% SOD CHL

## (undated) DEVICE — CONTAINER DRN 20L DISP FLUIDRN LLS

## (undated) DEVICE — LASER URETERAL CATHETER: Brand: COOK

## (undated) DEVICE — BLANKET WRM AD W50XL85.8IN PACU FULL BODY FORC AIR

## (undated) DEVICE — SYR 10ML LUER LOK 1/5ML GRAD --

## (undated) DEVICE — STATLOCKTM STABILIZATION DEVICES (PICC PLUS, CRESCENT FOAM PAD, FIXED POST RETAINER): Brand: STATLOCK

## (undated) DEVICE — GARMENT,MEDLINE,DVT,INT,CALF,MED, GEN2: Brand: MEDLINE

## (undated) DEVICE — SYRINGE,TOOMEY,IRRIGATION,70CC,STERILE: Brand: MEDLINE

## (undated) DEVICE — SPONGE GZ W4XL4IN COT 12 PLY TYP VII WVN C FLD DSGN

## (undated) DEVICE — ROTATIONS-MORCELLATOR Ø 4.8MM WL 335MM  FOR MORCESCOPE, FOR MORCELLATION FOLLOWING LASER PROSTATE ENUCLEATION, STERILE: Brand: PIRANHA

## (undated) DEVICE — SNAP KOVER: Brand: UNBRANDED

## (undated) DEVICE — UROLOGY SET

## (undated) DEVICE — TUBE FOR SUCTION  PVC, PACK=10 PCS, STERILE, FOR SINGLE USE: Brand: PIRANHA

## (undated) DEVICE — SOLUTION SCRB 4OZ 10% PVP I POVIDONE IOD TOP PAINT EXIDINE

## (undated) DEVICE — COVER SURG EQUIP W36XL28IN W/ E BND ARND BTM

## (undated) DEVICE — CATHETER FOL 3 W 22 FR 75 CC URETH CREEVY LUBRICATH

## (undated) DEVICE — BAG DRAINAGE CUST DISP

## (undated) DEVICE — SOLUTION IRRIG 3000ML 0.9% SOD CHL FLX CONT 0797208] ICU MEDICAL INC]